# Patient Record
Sex: FEMALE | Race: WHITE | NOT HISPANIC OR LATINO | Employment: OTHER | URBAN - METROPOLITAN AREA
[De-identification: names, ages, dates, MRNs, and addresses within clinical notes are randomized per-mention and may not be internally consistent; named-entity substitution may affect disease eponyms.]

---

## 2017-03-20 ENCOUNTER — TRANSCRIBE ORDERS (OUTPATIENT)
Dept: ADMINISTRATIVE | Facility: HOSPITAL | Age: 42
End: 2017-03-20

## 2017-03-20 DIAGNOSIS — Z12.31 VISIT FOR SCREENING MAMMOGRAM: Primary | ICD-10-CM

## 2017-04-24 ENCOUNTER — HOSPITAL ENCOUNTER (OUTPATIENT)
Dept: MAMMOGRAPHY | Facility: HOSPITAL | Age: 42
Discharge: HOME | End: 2017-04-24
Attending: OBSTETRICS & GYNECOLOGY | Admitting: OBSTETRICS & GYNECOLOGY

## 2017-04-24 DIAGNOSIS — Z12.31 VISIT FOR SCREENING MAMMOGRAM: ICD-10-CM

## 2017-04-24 PROCEDURE — G0202 SCR MAMMO BI INCL CAD: HCPCS

## 2017-04-24 PROCEDURE — 77067 SCR MAMMO BI INCL CAD: CPT | Performed by: RADIOLOGY

## 2017-04-24 PROCEDURE — 77063 BREAST TOMOSYNTHESIS BI: CPT

## 2017-04-24 PROCEDURE — 77063 BREAST TOMOSYNTHESIS BI: CPT | Performed by: RADIOLOGY

## 2021-03-29 RX ORDER — LEVOTHYROXINE SODIUM 0.1 MG/1
TABLET ORAL
Qty: 30 TABLET | Refills: 1 | Status: SHIPPED | OUTPATIENT
Start: 2021-03-29 | End: 2021-04-07 | Stop reason: SDUPTHER

## 2021-04-07 RX ORDER — LEVOTHYROXINE SODIUM 0.1 MG/1
TABLET ORAL
Qty: 30 TABLET | Refills: 2 | Status: SHIPPED | OUTPATIENT
Start: 2021-04-07 | End: 2021-07-23 | Stop reason: SDUPTHER

## 2021-07-23 ENCOUNTER — OFFICE VISIT (OUTPATIENT)
Dept: ENDOCRINOLOGY | Facility: CLINIC | Age: 46
End: 2021-07-23

## 2021-07-23 ENCOUNTER — LAB (OUTPATIENT)
Dept: LAB | Facility: HOSPITAL | Age: 46
End: 2021-07-23

## 2021-07-23 VITALS
HEIGHT: 65 IN | SYSTOLIC BLOOD PRESSURE: 110 MMHG | HEART RATE: 60 BPM | BODY MASS INDEX: 35.32 KG/M2 | WEIGHT: 212 LBS | DIASTOLIC BLOOD PRESSURE: 72 MMHG

## 2021-07-23 DIAGNOSIS — E89.0 POSTABLATIVE HYPOTHYROIDISM: Primary | Chronic | ICD-10-CM

## 2021-07-23 DIAGNOSIS — E89.0 POSTABLATIVE HYPOTHYROIDISM: Chronic | ICD-10-CM

## 2021-07-23 LAB
T4 FREE SERPL-MCNC: 1.09 NG/DL (ref 0.93–1.7)
TSH SERPL DL<=0.05 MIU/L-ACNC: 1.81 UIU/ML (ref 0.27–4.2)

## 2021-07-23 PROCEDURE — 84439 ASSAY OF FREE THYROXINE: CPT

## 2021-07-23 PROCEDURE — 84443 ASSAY THYROID STIM HORMONE: CPT

## 2021-07-23 PROCEDURE — 99213 OFFICE O/P EST LOW 20 MIN: CPT | Performed by: PHYSICIAN ASSISTANT

## 2021-07-23 RX ORDER — SERTRALINE HYDROCHLORIDE 100 MG/1
100 TABLET, FILM COATED ORAL DAILY
COMMUNITY
Start: 2021-06-22

## 2021-07-23 RX ORDER — LEVOTHYROXINE SODIUM 0.1 MG/1
100 TABLET ORAL DAILY
Qty: 30 TABLET | Refills: 11 | Status: SHIPPED | OUTPATIENT
Start: 2021-07-23 | End: 2022-08-26 | Stop reason: SDUPTHER

## 2021-07-23 NOTE — PROGRESS NOTES
"     Office Note      Date: 2021  Patient Name: Tracee Borges  MRN: [id number]  : 1975    Chief Complaint   Patient presents with   • Hypothyroidism       History of Present Illness:   Tracee Borges is a 45 y.o. female who presents today for postablative hypothyroidism.  She has history of Graves' disease, s/p I-131 on 3/27/2014.  Levothyroxine was increased from 88 mcg daily to 200 mcg daily last year in 3/2020.  She reports having labs with her PCP, Dr. Sirena Payton, in November and thinks that she had her thyroid tested.  She reports fatigue has improved.  She reports hair is getting thinner.  She denies other symptoms of hypo or hyperthyroidism at this time.  She has not noticed any changes in size of her neck.  She denies any problems with her eyes.    Subjective      Review of Systems:  Review of Systems   Constitutional: Negative.         Hair loss   HENT: Negative.    Eyes: Negative.    Cardiovascular: Negative.    Gastrointestinal: Negative.    Endocrine: Negative.      Past Medical History:   Diagnosis Date   • Gestational diabetes mellitus    • Graves' disease     s/p 18 mCi I-131 on 3/27/2014   • Obesity    • Postablative hypothyroidism      Past Surgical History:   Procedure Laterality Date   •  SECTION      x2   • GASTRIC SLEEVE LAPAROSCOPIC  2017       The following portions of the patient's history were reviewed and updated as appropriate: allergies, current medications, past family history, past medical history, past social history, past surgical history and problem list.    Objective     Vitals:    21 1357   BP: 110/72   Pulse: 60   Weight: 96.2 kg (212 lb)   Height: 165.1 cm (65\")   PainSc: 0-No pain     Body mass index is 35.28 kg/m².    Physical Exam  Vitals reviewed.   Constitutional:       General: She is not in acute distress.  Neck:      Thyroid: No thyroid mass, thyromegaly or thyroid tenderness.   Lymphadenopathy:      Cervical: No cervical adenopathy. "   Neurological:      Mental Status: She is alert and oriented to person, place, and time.   Psychiatric:         Attention and Perception: Attention normal.         Mood and Affect: Mood normal.         Current Outpatient Medications   Medication Instructions   • levothyroxine (SYNTHROID, LEVOTHROID) 100 mcg, Oral, Daily   • sertraline (ZOLOFT) 100 mg, Oral, Daily   • Sprintec 28 0.25-35 MG-MCG per tablet 1 tablet, Oral, Daily       Assessment / Plan      Assessment & Plan:  1. Postablative hypothyroidism  Clinically euthyroid.  Continue levothyroxine.  Check TFTs today.  Will notify her of results and if dose adjustment is indicated.  If euthyroid, she will follow up in 1 year, sooner PRN.  - TSH; Future  - T4, Free; Future  - levothyroxine (SYNTHROID, LEVOTHROID) 100 MCG tablet; Take 1 tablet by mouth Daily.  Dispense: 30 tablet; Refill: 11      Return in about 1 year (around 7/23/2022) for Next scheduled follow up.    JEANA Ravi  Endocrinology  07/23/2021

## 2022-08-10 ENCOUNTER — PRE-ADMISSION TESTING (OUTPATIENT)
Dept: PREADMISSION TESTING | Facility: HOSPITAL | Age: 47
End: 2022-08-10

## 2022-08-10 VITALS — WEIGHT: 199.74 LBS | BODY MASS INDEX: 33.28 KG/M2 | HEIGHT: 65 IN

## 2022-08-10 LAB
ANION GAP SERPL CALCULATED.3IONS-SCNC: 10 MMOL/L (ref 5–15)
BUN SERPL-MCNC: 12 MG/DL (ref 6–20)
BUN/CREAT SERPL: 13.5 (ref 7–25)
CALCIUM SPEC-SCNC: 9 MG/DL (ref 8.6–10.5)
CHLORIDE SERPL-SCNC: 103 MMOL/L (ref 98–107)
CO2 SERPL-SCNC: 27 MMOL/L (ref 22–29)
CREAT SERPL-MCNC: 0.89 MG/DL (ref 0.57–1)
DEPRECATED RDW RBC AUTO: 47.8 FL (ref 37–54)
EGFRCR SERPLBLD CKD-EPI 2021: 81.6 ML/MIN/1.73
ERYTHROCYTE [DISTWIDTH] IN BLOOD BY AUTOMATED COUNT: 13.4 % (ref 12.3–15.4)
GLUCOSE SERPL-MCNC: 89 MG/DL (ref 65–99)
HCT VFR BLD AUTO: 38.2 % (ref 34–46.6)
HGB BLD-MCNC: 12.9 G/DL (ref 12–15.9)
MCH RBC QN AUTO: 32.3 PG (ref 26.6–33)
MCHC RBC AUTO-ENTMCNC: 33.8 G/DL (ref 31.5–35.7)
MCV RBC AUTO: 95.5 FL (ref 79–97)
PLATELET # BLD AUTO: 367 10*3/MM3 (ref 140–450)
PMV BLD AUTO: 8.8 FL (ref 6–12)
POTASSIUM SERPL-SCNC: 4.2 MMOL/L (ref 3.5–5.2)
RBC # BLD AUTO: 4 10*6/MM3 (ref 3.77–5.28)
SARS-COV-2 RNA PNL SPEC NAA+PROBE: NOT DETECTED
SODIUM SERPL-SCNC: 140 MMOL/L (ref 136–145)
WBC NRBC COR # BLD: 8.08 10*3/MM3 (ref 3.4–10.8)

## 2022-08-10 PROCEDURE — C9803 HOPD COVID-19 SPEC COLLECT: HCPCS

## 2022-08-10 PROCEDURE — U0004 COV-19 TEST NON-CDC HGH THRU: HCPCS

## 2022-08-10 PROCEDURE — 80048 BASIC METABOLIC PNL TOTAL CA: CPT

## 2022-08-10 PROCEDURE — 36415 COLL VENOUS BLD VENIPUNCTURE: CPT

## 2022-08-10 PROCEDURE — 85027 COMPLETE CBC AUTOMATED: CPT

## 2022-08-10 NOTE — PAT
An arrival time for procedure was not given during PAT visit. If patient had any questions or concerns about their arrival time, they were instructed to contact their surgeon/physician.  Additionally, if the patient referred to an arrival time that was acquired from their my chart account, patient was encouraged to verify that time with their surgeon/physician.  NO arrival times given in Pre Admission Testing Department.    Patient to apply Chlorhexadine wipes  to surgical area (as instructed) the night before procedure and the AM of procedure. Wipes provided.    Consent signed.

## 2022-08-11 ENCOUNTER — ANESTHESIA EVENT (OUTPATIENT)
Dept: PERIOP | Facility: HOSPITAL | Age: 47
End: 2022-08-11

## 2022-08-11 RX ORDER — SODIUM CHLORIDE 0.9 % (FLUSH) 0.9 %
10 SYRINGE (ML) INJECTION AS NEEDED
Status: CANCELLED | OUTPATIENT
Start: 2022-08-11

## 2022-08-11 RX ORDER — FAMOTIDINE 10 MG/ML
20 INJECTION, SOLUTION INTRAVENOUS ONCE
Status: CANCELLED | OUTPATIENT
Start: 2022-08-11 | End: 2022-08-11

## 2022-08-11 RX ORDER — SODIUM CHLORIDE 0.9 % (FLUSH) 0.9 %
10 SYRINGE (ML) INJECTION EVERY 12 HOURS SCHEDULED
Status: CANCELLED | OUTPATIENT
Start: 2022-08-11

## 2022-08-12 ENCOUNTER — ANESTHESIA (OUTPATIENT)
Dept: PERIOP | Facility: HOSPITAL | Age: 47
End: 2022-08-12

## 2022-08-12 ENCOUNTER — HOSPITAL ENCOUNTER (OUTPATIENT)
Facility: HOSPITAL | Age: 47
Discharge: HOME | End: 2022-08-13
Attending: PLASTIC SURGERY | Admitting: PLASTIC SURGERY

## 2022-08-12 DIAGNOSIS — N64.81 BREAST PTOSIS: ICD-10-CM

## 2022-08-12 LAB
B-HCG UR QL: NEGATIVE
EXPIRATION DATE: NORMAL
INTERNAL NEGATIVE CONTROL: NORMAL
INTERNAL POSITIVE CONTROL: NORMAL
Lab: NORMAL

## 2022-08-12 PROCEDURE — 25010000002 HYDROMORPHONE PER 4 MG: Performed by: NURSE ANESTHETIST, CERTIFIED REGISTERED

## 2022-08-12 PROCEDURE — 25010000002 PROPOFOL 10 MG/ML EMULSION: Performed by: NURSE ANESTHETIST, CERTIFIED REGISTERED

## 2022-08-12 PROCEDURE — 25010000002 ONDANSETRON PER 1 MG: Performed by: NURSE ANESTHETIST, CERTIFIED REGISTERED

## 2022-08-12 PROCEDURE — 0 LIDOCAINE 1 % SOLUTION 20 ML VIAL: Performed by: PLASTIC SURGERY

## 2022-08-12 PROCEDURE — 25010000002 CEFAZOLIN IN DEXTROSE 2-4 GM/100ML-% SOLUTION: Performed by: PLASTIC SURGERY

## 2022-08-12 PROCEDURE — 25010000002 FENTANYL CITRATE (PF) 50 MCG/ML SOLUTION: Performed by: NURSE ANESTHETIST, CERTIFIED REGISTERED

## 2022-08-12 PROCEDURE — 25010000002 FENTANYL CITRATE (PF) 50 MCG/ML SOLUTION

## 2022-08-12 PROCEDURE — 25010000002 EPINEPHRINE PER 0.1 MG: Performed by: PLASTIC SURGERY

## 2022-08-12 PROCEDURE — 25010000002 DEXAMETHASONE PER 1 MG: Performed by: NURSE ANESTHETIST, CERTIFIED REGISTERED

## 2022-08-12 PROCEDURE — 88305 TISSUE EXAM BY PATHOLOGIST: CPT | Performed by: PLASTIC SURGERY

## 2022-08-12 PROCEDURE — 81025 URINE PREGNANCY TEST: CPT | Performed by: PLASTIC SURGERY

## 2022-08-12 DEVICE — DEV CONTRL TISS STRATAFIX SPIRAL MNCRYL UD 3/0 PLS 60CM: Type: IMPLANTABLE DEVICE | Site: BREAST | Status: FUNCTIONAL

## 2022-08-12 RX ORDER — SODIUM CHLORIDE 9 MG/ML
50 INJECTION, SOLUTION INTRAVENOUS CONTINUOUS
Status: DISCONTINUED | OUTPATIENT
Start: 2022-08-12 | End: 2022-08-13 | Stop reason: HOSPADM

## 2022-08-12 RX ORDER — FENTANYL CITRATE 50 UG/ML
50 INJECTION, SOLUTION INTRAMUSCULAR; INTRAVENOUS
Status: DISCONTINUED | OUTPATIENT
Start: 2022-08-12 | End: 2022-08-12 | Stop reason: HOSPADM

## 2022-08-12 RX ORDER — LIDOCAINE HYDROCHLORIDE 10 MG/ML
0.5 INJECTION, SOLUTION EPIDURAL; INFILTRATION; INTRACAUDAL; PERINEURAL ONCE AS NEEDED
Status: COMPLETED | OUTPATIENT
Start: 2022-08-12 | End: 2022-08-12

## 2022-08-12 RX ORDER — CEPHALEXIN 250 MG/1
500 CAPSULE ORAL EVERY 12 HOURS SCHEDULED
Status: DISCONTINUED | OUTPATIENT
Start: 2022-08-12 | End: 2022-08-13 | Stop reason: HOSPADM

## 2022-08-12 RX ORDER — DIPHENHYDRAMINE HYDROCHLORIDE 50 MG/ML
12.5 INJECTION INTRAMUSCULAR; INTRAVENOUS EVERY 6 HOURS PRN
Status: DISCONTINUED | OUTPATIENT
Start: 2022-08-12 | End: 2022-08-13 | Stop reason: HOSPADM

## 2022-08-12 RX ORDER — MELOXICAM 7.5 MG/1
15 TABLET ORAL DAILY
Status: DISCONTINUED | OUTPATIENT
Start: 2022-08-12 | End: 2022-08-13 | Stop reason: HOSPADM

## 2022-08-12 RX ORDER — HYDROMORPHONE HYDROCHLORIDE 1 MG/ML
0.5 INJECTION, SOLUTION INTRAMUSCULAR; INTRAVENOUS; SUBCUTANEOUS
Status: DISCONTINUED | OUTPATIENT
Start: 2022-08-12 | End: 2022-08-12 | Stop reason: HOSPADM

## 2022-08-12 RX ORDER — SODIUM CHLORIDE, SODIUM LACTATE, POTASSIUM CHLORIDE, CALCIUM CHLORIDE 600; 310; 30; 20 MG/100ML; MG/100ML; MG/100ML; MG/100ML
9 INJECTION, SOLUTION INTRAVENOUS CONTINUOUS
Status: DISCONTINUED | OUTPATIENT
Start: 2022-08-12 | End: 2022-08-13 | Stop reason: HOSPADM

## 2022-08-12 RX ORDER — HYDROMORPHONE HCL 110MG/55ML
PATIENT CONTROLLED ANALGESIA SYRINGE INTRAVENOUS AS NEEDED
Status: DISCONTINUED | OUTPATIENT
Start: 2022-08-12 | End: 2022-08-12 | Stop reason: SURG

## 2022-08-12 RX ORDER — FAMOTIDINE 20 MG/1
20 TABLET, FILM COATED ORAL ONCE
Status: COMPLETED | OUTPATIENT
Start: 2022-08-12 | End: 2022-08-12

## 2022-08-12 RX ORDER — MAGNESIUM HYDROXIDE 1200 MG/15ML
LIQUID ORAL AS NEEDED
Status: DISCONTINUED | OUTPATIENT
Start: 2022-08-12 | End: 2022-08-12 | Stop reason: HOSPADM

## 2022-08-12 RX ORDER — DEXAMETHASONE SODIUM PHOSPHATE 4 MG/ML
INJECTION, SOLUTION INTRA-ARTICULAR; INTRALESIONAL; INTRAMUSCULAR; INTRAVENOUS; SOFT TISSUE AS NEEDED
Status: DISCONTINUED | OUTPATIENT
Start: 2022-08-12 | End: 2022-08-12 | Stop reason: SURG

## 2022-08-12 RX ORDER — CEFAZOLIN SODIUM 2 G/100ML
2 INJECTION, SOLUTION INTRAVENOUS ONCE
Status: COMPLETED | OUTPATIENT
Start: 2022-08-12 | End: 2022-08-12

## 2022-08-12 RX ORDER — CYCLOBENZAPRINE HCL 10 MG
10 TABLET ORAL EVERY 8 HOURS PRN
Status: DISCONTINUED | OUTPATIENT
Start: 2022-08-12 | End: 2022-08-13 | Stop reason: HOSPADM

## 2022-08-12 RX ORDER — ROCURONIUM BROMIDE 10 MG/ML
INJECTION, SOLUTION INTRAVENOUS AS NEEDED
Status: DISCONTINUED | OUTPATIENT
Start: 2022-08-12 | End: 2022-08-12 | Stop reason: SURG

## 2022-08-12 RX ORDER — EPHEDRINE SULFATE 50 MG/ML
INJECTION, SOLUTION INTRAVENOUS AS NEEDED
Status: DISCONTINUED | OUTPATIENT
Start: 2022-08-12 | End: 2022-08-12 | Stop reason: SURG

## 2022-08-12 RX ORDER — LIDOCAINE HYDROCHLORIDE 10 MG/ML
INJECTION, SOLUTION EPIDURAL; INFILTRATION; INTRACAUDAL; PERINEURAL AS NEEDED
Status: DISCONTINUED | OUTPATIENT
Start: 2022-08-12 | End: 2022-08-12 | Stop reason: SURG

## 2022-08-12 RX ORDER — MIDAZOLAM HYDROCHLORIDE 1 MG/ML
1 INJECTION INTRAMUSCULAR; INTRAVENOUS
Status: DISCONTINUED | OUTPATIENT
Start: 2022-08-12 | End: 2022-08-12 | Stop reason: HOSPADM

## 2022-08-12 RX ORDER — PROMETHAZINE HYDROCHLORIDE 12.5 MG/1
6.25 TABLET ORAL EVERY 6 HOURS PRN
Status: DISCONTINUED | OUTPATIENT
Start: 2022-08-12 | End: 2022-08-13 | Stop reason: HOSPADM

## 2022-08-12 RX ORDER — GLYCOPYRROLATE 0.2 MG/ML
INJECTION INTRAMUSCULAR; INTRAVENOUS AS NEEDED
Status: DISCONTINUED | OUTPATIENT
Start: 2022-08-12 | End: 2022-08-12 | Stop reason: SURG

## 2022-08-12 RX ORDER — ACETAMINOPHEN 500 MG
1000 TABLET ORAL EVERY 6 HOURS
Status: DISCONTINUED | OUTPATIENT
Start: 2022-08-12 | End: 2022-08-13 | Stop reason: HOSPADM

## 2022-08-12 RX ORDER — PROMETHAZINE HYDROCHLORIDE 12.5 MG/1
6.25 SUPPOSITORY RECTAL EVERY 6 HOURS PRN
Status: DISCONTINUED | OUTPATIENT
Start: 2022-08-12 | End: 2022-08-13 | Stop reason: HOSPADM

## 2022-08-12 RX ORDER — ONDANSETRON 2 MG/ML
4 INJECTION INTRAMUSCULAR; INTRAVENOUS EVERY 6 HOURS PRN
Status: DISCONTINUED | OUTPATIENT
Start: 2022-08-12 | End: 2022-08-13 | Stop reason: HOSPADM

## 2022-08-12 RX ORDER — FENTANYL CITRATE 50 UG/ML
INJECTION, SOLUTION INTRAMUSCULAR; INTRAVENOUS
Status: COMPLETED
Start: 2022-08-12 | End: 2022-08-12

## 2022-08-12 RX ORDER — OXYCODONE HYDROCHLORIDE 5 MG/1
5 TABLET ORAL EVERY 4 HOURS PRN
Status: DISCONTINUED | OUTPATIENT
Start: 2022-08-12 | End: 2022-08-13 | Stop reason: HOSPADM

## 2022-08-12 RX ORDER — FENTANYL CITRATE 50 UG/ML
INJECTION, SOLUTION INTRAMUSCULAR; INTRAVENOUS AS NEEDED
Status: DISCONTINUED | OUTPATIENT
Start: 2022-08-12 | End: 2022-08-12 | Stop reason: SURG

## 2022-08-12 RX ORDER — ONDANSETRON 2 MG/ML
INJECTION INTRAMUSCULAR; INTRAVENOUS AS NEEDED
Status: DISCONTINUED | OUTPATIENT
Start: 2022-08-12 | End: 2022-08-12 | Stop reason: SURG

## 2022-08-12 RX ORDER — PROPOFOL 10 MG/ML
VIAL (ML) INTRAVENOUS AS NEEDED
Status: DISCONTINUED | OUTPATIENT
Start: 2022-08-12 | End: 2022-08-12 | Stop reason: SURG

## 2022-08-12 RX ADMIN — OXYCODONE 5 MG: 5 TABLET ORAL at 22:07

## 2022-08-12 RX ADMIN — SODIUM CHLORIDE 50 ML/HR: 9 INJECTION, SOLUTION INTRAVENOUS at 14:30

## 2022-08-12 RX ADMIN — OXYCODONE 5 MG: 5 TABLET ORAL at 17:40

## 2022-08-12 RX ADMIN — FENTANYL CITRATE 50 MCG: 50 INJECTION, SOLUTION INTRAMUSCULAR; INTRAVENOUS at 13:25

## 2022-08-12 RX ADMIN — CEFAZOLIN SODIUM 2 G: 2 INJECTION, SOLUTION INTRAVENOUS at 10:36

## 2022-08-12 RX ADMIN — EPHEDRINE SULFATE 5 MG: 50 INJECTION INTRAVENOUS at 07:49

## 2022-08-12 RX ADMIN — CEFAZOLIN SODIUM 2 G: 2 INJECTION, SOLUTION INTRAVENOUS at 07:36

## 2022-08-12 RX ADMIN — ACETAMINOPHEN 1000 MG: 500 TABLET, FILM COATED ORAL at 20:46

## 2022-08-12 RX ADMIN — GLYCOPYRROLATE 0.2 MG: 0.2 INJECTION INTRAMUSCULAR; INTRAVENOUS at 07:49

## 2022-08-12 RX ADMIN — LIDOCAINE HYDROCHLORIDE 100 MG: 10 INJECTION, SOLUTION EPIDURAL; INFILTRATION; INTRACAUDAL; PERINEURAL at 07:30

## 2022-08-12 RX ADMIN — PROPOFOL 200 MG: 10 INJECTION, EMULSION INTRAVENOUS at 07:30

## 2022-08-12 RX ADMIN — ROCURONIUM BROMIDE 20 MG: 50 INJECTION, SOLUTION INTRAVENOUS at 08:41

## 2022-08-12 RX ADMIN — SODIUM CHLORIDE, POTASSIUM CHLORIDE, SODIUM LACTATE AND CALCIUM CHLORIDE: 600; 310; 30; 20 INJECTION, SOLUTION INTRAVENOUS at 08:48

## 2022-08-12 RX ADMIN — FAMOTIDINE 20 MG: 20 TABLET ORAL at 06:13

## 2022-08-12 RX ADMIN — HYDROMORPHONE HYDROCHLORIDE 0.5 MG: 2 INJECTION, SOLUTION INTRAMUSCULAR; INTRAVENOUS; SUBCUTANEOUS at 12:06

## 2022-08-12 RX ADMIN — LIDOCAINE HYDROCHLORIDE 0.5 ML: 10 INJECTION, SOLUTION EPIDURAL; INFILTRATION; INTRACAUDAL; PERINEURAL at 06:13

## 2022-08-12 RX ADMIN — SODIUM CHLORIDE, POTASSIUM CHLORIDE, SODIUM LACTATE AND CALCIUM CHLORIDE: 600; 310; 30; 20 INJECTION, SOLUTION INTRAVENOUS at 12:29

## 2022-08-12 RX ADMIN — ONDANSETRON 4 MG: 2 INJECTION INTRAMUSCULAR; INTRAVENOUS at 11:57

## 2022-08-12 RX ADMIN — MELOXICAM 15 MG: 7.5 TABLET ORAL at 14:29

## 2022-08-12 RX ADMIN — ROCURONIUM BROMIDE 15 MG: 50 INJECTION, SOLUTION INTRAVENOUS at 10:11

## 2022-08-12 RX ADMIN — ROCURONIUM BROMIDE 50 MG: 50 INJECTION, SOLUTION INTRAVENOUS at 07:30

## 2022-08-12 RX ADMIN — FENTANYL CITRATE 100 MCG: 50 INJECTION, SOLUTION INTRAMUSCULAR; INTRAVENOUS at 07:30

## 2022-08-12 RX ADMIN — ACETAMINOPHEN 1000 MG: 500 TABLET, FILM COATED ORAL at 14:29

## 2022-08-12 RX ADMIN — HYDROMORPHONE HYDROCHLORIDE 0.5 MG: 2 INJECTION, SOLUTION INTRAMUSCULAR; INTRAVENOUS; SUBCUTANEOUS at 08:42

## 2022-08-12 RX ADMIN — CEPHALEXIN 500 MG: 250 CAPSULE ORAL at 20:46

## 2022-08-12 RX ADMIN — PROPOFOL 25 MCG/KG/MIN: 10 INJECTION, EMULSION INTRAVENOUS at 07:35

## 2022-08-12 RX ADMIN — DEXAMETHASONE SODIUM PHOSPHATE 8 MG: 4 INJECTION, SOLUTION INTRA-ARTICULAR; INTRALESIONAL; INTRAMUSCULAR; INTRAVENOUS; SOFT TISSUE at 07:36

## 2022-08-12 RX ADMIN — SODIUM CHLORIDE, POTASSIUM CHLORIDE, SODIUM LACTATE AND CALCIUM CHLORIDE 9 ML/HR: 600; 310; 30; 20 INJECTION, SOLUTION INTRAVENOUS at 06:14

## 2022-08-12 NOTE — ANESTHESIA PREPROCEDURE EVALUATION
Anesthesia Evaluation                  Airway   Mallampati: II  TM distance: >3 FB  Neck ROM: full  No difficulty expected  Dental - normal exam     Pulmonary - normal exam   Cardiovascular - normal exam        Neuro/Psych  (+) psychiatric history Anxiety,    GI/Hepatic/Renal/Endo    (+) obesity,   thyroid problem (GRAVE'S DZ) hypothyroidism    Musculoskeletal     Abdominal  - normal exam    Bowel sounds: normal.   Substance History      OB/GYN          Other                      Anesthesia Plan    ASA 2     general     intravenous induction     Anesthetic plan, risks, benefits, and alternatives have been provided, discussed and informed consent has been obtained with: patient.    Plan discussed with CRNA.        CODE STATUS:

## 2022-08-12 NOTE — ANESTHESIA PROCEDURE NOTES
Airway  Urgency: elective    Date/Time: 8/12/2022 7:32 AM  Airway not difficult    General Information and Staff    Patient location during procedure: OR  CRNA/CAA: Nadiya Reilly CRNA    Indications and Patient Condition  Indications for airway management: airway protection    Preoxygenated: yes  MILS maintained throughout  Mask difficulty assessment: 1 - vent by mask    Final Airway Details  Final airway type: endotracheal airway      Successful airway: ETT  Cuffed: yes   Successful intubation technique: direct laryngoscopy  Facilitating devices/methods: intubating stylet  Endotracheal tube insertion site: oral  Blade: Christine  Blade size: 3  ETT size (mm): 7.0  Cormack-Lehane Classification: grade I - full view of glottis  Placement verified by: chest auscultation and capnometry   Measured from: lips  ETT/EBT  to lips (cm): 21  Number of attempts at approach: 1  Assessment: lips, teeth, and gum same as pre-op and atraumatic intubation    Additional Comments  Negative epigastric sounds, symmetrical chest rise and fall, bilateral lung sounds clear, dentition and lungs unchanged-atraumatic

## 2022-08-12 NOTE — H&P
Plastic Surgery Consult      Admission Date:  2022  LOS:  0  Patient Care Team:  Sirena Payton MD as PCP - General (Internal Medicine)  Provider, No Known as PCP - Family Medicine    Patient Name:  Tracee Borges  :  1975  MRN:  [id number]    Date:  2022    Breast Mastopexy Consult  Chief Complaint: breast ptosis    HPI:  Tracee Borges 46yoF nonsmoker hx of graves disease with bilateral macromastia and breast ptosis. Patient states upper back and neck pain but is more interested in a breast mastopexy than a reduction. Patient states no breast surgeries. Patient states no family history of breast cancer. Patient states last mammogram 1 year ago was benign. Patient has had two children no miscarriages. Patient she wears a 40 D and would like to be a little smaller Full C/D and lifted. Patient has thought about this for a long time. Patient was seen in [facility] clinic and found appropriate for mastopexy than a reduction. Patient was then scheduled for OR.     Breast Goal:  40D to a Full C/D more lifted than reduced    PMH: Graves’ disease  PSH: postablative hypothyroidism  SH: nonmoker, denies drinking or illicit drug use. Lives in [place]. 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  FH: no Family Hx of breast cancer  Medications: No blood thinner or steroids      History:   Past Medical History:   Diagnosis Date   • Anxiety    • Gestational diabetes mellitus    • Graves' disease     s/p 18 mCi I-131 on 3/27/2014   • History of COVID-19    • Obesity    • Postablative hypothyroidism      Past Surgical History:   Procedure Laterality Date   •  SECTION      x2   • COLONOSCOPY     • GASTRIC SLEEVE LAPAROSCOPIC  2017     Family History   Problem Relation Age of Onset   • Thyroid disease Mother    • Breast cancer Neg Hx    • Ovarian cancer Neg Hx      Social History     Socioeconomic History   • Marital status:    Tobacco Use   • Smoking status: Never Smoker   • Smokeless tobacco: Never Used    Vaping Use   • Vaping Use: Never used   Substance and Sexual Activity   • Alcohol use: Yes     Comment: socially   • Drug use: Never   • Sexual activity: Defer     No Known Allergies    Medication:    Current Facility-Administered Medications:   •  ceFAZolin in dextrose (ANCEF) IVPB solution 2 g, 2 g, Intravenous, Once, Naresh Valle MD  •  lactated ringers infusion, 9 mL/hr, Intravenous, Continuous, Parker Ware MD, Last Rate: 9 mL/hr at 2214, 9 mL/hr at 22  •  midazolam (VERSED) injection 1 mg, 1 mg, Intravenous, Q10 Min PRN, Parker Ware MD    Antibiotics:  Anti-Infectives (From admission, onward)    Ordered     Dose/Rate Route Frequency Start Stop    22 0545  ceFAZolin in dextrose (ANCEF) IVPB solution 2 g        Ordering Provider: Naresh Valle MD    2 g  over 30 Minutes Intravenous Once 22 0547            No Known Allergies       Review of Systems:  Constitutional-- No Fever, chills or sweats.  Appetite okay, and no malaise. No fatigue.  HEENT-- No new vision, hearing or throat complaints.  No epistaxis or oral sores.  Denies odynophagia or dysphagia. No headache, photophobia or neck stiffness.  CV-- No chest pain, palpitation or syncope  Resp-- No SOB/cough/Hemoptysis  GI- No nausea, vomiting, or diarrhea.  No hematochezia, melena, or hematemesis. Denies jaundice or chronic liver disease.  -- No dysuria, hematuria, or flank pain.  Denies hesitancy, urgency or flank pain.  Lymph- no swollen lymph nodes in neck/axilla or groin.   Heme- No active bruising or bleeding; no Hx of DVT or PE.  MS-- no swelling or pain in the bones or joints of arms/legs.  No new back pain.  He does not verbalize pain per sister at bedside.  Neuro-- No acute focal weakness or numbness in the arms or legs.  No seizures.  Skin--No rashes.       Physical Exam:   Vital Signs:  Temp (24hrs), Av.4 °F (36.3 °C), Min:97.4 °F (36.3 °C), Max:97.4 °F (36.3 °C)    Temp  Min: 97.4 °F (36.3 °C)  Max:  97.4 °F (36.3 °C)  BP  Min: 113/71  Max: 113/71  Pulse  Min: 58  Max: 58  Resp  Min: 18  Max: 18  SpO2  Min: 98 %  Max: 98 %    Ht: 65 inches  Wt: 205 lbs  BMI: 34  BSA: 2 based on the schnur scale 628 grams    General: Well developed, well nourished, alert and cooperative, and appears to be in no acute distress.  Head: normocephalic.  HEENT: Normocephalic, atraumatic.  EOMI. No conjunctival injection. No icterus. No nasal discharge.  NECK: Neck supple, non-tender without lymphadenopathy.  Heart: Rhythm is regular. There is no peripheral edema, cyanosis or pallor. Extremities are warm and well perfused. Capillary refill is less than 2 seconds.  Lungs: Normal respiratory effort. Nonlabored. No dullness.  Abdomen: Soft, nontender, nondistended. No rebound or guarding.  Musculoskeletal: ROM intact spine and extremities. No joint erythema or tenderness.      Breast Exam: bilateral grade II breast ptosis, low breasted, medium sized areolas, symmetric in shape and volume (Lt>Rt slightly), excess skin laterally with lateral flank fat. No palpable masses or lymphadenopathy.    Rt Breast  Breast BW: 14 cm  SN-N: 33 cm  N-IMF: 15 cm  Raise the Rt nipple: 5-6 cm    Lt Breast  Breast BW: 14 cm  SN-N: 35 cm  N-IMF: 15 cm  Raise the Rt nipple: 6 cm    Laboratory Data:  Results from last 7 days   Lab Units 08/10/22  0926   WBC 10*3/mm3 8.08   HEMOGLOBIN g/dL 12.9   HEMATOCRIT % 38.2   PLATELETS 10*3/mm3 367     Results from last 7 days   Lab Units 08/10/22  0926   SODIUM mmol/L 140   POTASSIUM mmol/L 4.2   CHLORIDE mmol/L 103   CO2 mmol/L 27.0   BUN mg/dL 12   CREATININE mg/dL 0.89   GLUCOSE mg/dL 89   CALCIUM mg/dL 9.0                             Estimated Creatinine Clearance: 88.7 mL/min (by C-G formula based on SCr of 0.89 mg/dL).    Microbiology:  No results found for: ACANTHNAEG, AFBCX, BPERTUSSISCX, BLOODCX  No results found for: BCIDPCR, CXREFLEX, CSFCX, CULTURETIS  No results found for: CULTURES, HSVCX, URCX  No results  found for: EYECULTURE, GCCX, HSVCULTURE, LABHSV  No results found for: LEGIONELLA, MRSACX, MUMPSCX, MYCOPLASCX  No results found for: NOCARDIACX, STOOLCX  No results found for: THROATCX, UNSTIMCULT, URINECX, CULTURE, VZVCULTUR  No results found for: VIRALCULTU, WOUNDCX          Assessment: 46yoF nonsmoker hx of graves disease with bilateral macromastia and breast ptosis.    Plan:  - again based on PE and patient’s goals patient would benefit from a mastopexy.   - discussed the risk and benefits of the mastopexy procedure including complications (scars, tissue loss, wound healing issues particularly at the T junction of the incision, hematoma, loss or change in nipple sensation, inability to breast feed, nipple loss, infection, pulmonary embolism, death).  - discussed the risk and benefits of the liposuction procedure including complications scars, tissue loss, wound healing issues, hematoma, contour irregularities, lidocaine toxicity  - discussed cannot promise breast size or symmetry.  - mammogram 5/3/22 benign  - to OR for  bilateral breast mastopexy wise pattern excision with lateral flank liposuction. patient will stay overnight at [facility]          Naresh Valle MD  22  07:07 EDT

## 2022-08-12 NOTE — OP NOTE
Patient Name:  Tracee Borges  :  1975  MRN:  [id number]    Date of Surgery:  2022       Pre Operative Diagnosis: Bilateral macromastia  Post Operative Diagnosis:   Bilateral macromastia    PROCEDURES PERFORMED:  1) bilateral breast mastopexy superior pedicle with wise pattern excision  2) bilateral lateral chest flank liposuction about 1100 cc of liposuction    SURGEON: Naresh Valle MD    Staff:  Surgeon(s):  Naresh Valle MD    Circulator: Rob Salvador RN; Danae Padilla RN  Scrub Person: Chel Madrid; Susan Taylor  Nursing Assistant: Maryjane Abraham  Assistant: Carrie Haider RNFA     Assistant: Carrie Haider RNFA  was responsible for performing the following activities: Retraction, Suction, Irrigation, Suturing, Closing and Placing Dressing and their skilled assistance was necessary for the success of this case.    Anesthesia: General      Indications for the Procedure:      Operative Findings:  Liposuction: Rt 550 cc and Lt breast 550 cc    Description of the Procedure:     Patient was seen in preop risks and benefits were discussed with the patient who elected to proceed with the procedure. H&P was updated, consent was obtained, and patient was marked appropriately. Patient was then taken back to the OR placed in a supine position. Anesthesia was induced and the patient was then intubated. The patient was the prepped and draped in a sterile fashion. A time out was performed in which the patient and the procedure were gone over. Everyone was in agreement in terms of the patient and the procedure.     The bilateral breast pattern was remarked. A 15 blade was use to make a poke hole incision. Tumescent solution was placed in bilateral chest flanks 450 cc to tumescent solution per side for a total of 900 cc. A 4.6 mm cannula was used to perform separation of fat. A 5.2 mm cannula was used to perform fat aspiration a total of 550 cc per side for a grand total of 1100 cc of  liposuction. A 5.2 mm cannula was then used to to perform fat equalization.       Starting with the right breast the smaller of the breast. Our preoperative marks were then re measured for symmetry. A 45 mm areola marker was then used to measure the areola to be used. A 15 blade was then used to score the areola marker. The Mcginnis pattern and pedicle was then scored with a 15 blade. A 10 blade was then used to de epithelize the superior pedicle. The bovie cautery was then used to outline the pedicle. Once the pedicle was defined. The inferior breast tissue was excised. The skin flaps were then elevated keeping a 2 cm thickness consistency. The breast tissue was then placed on the back table to be measured. The breast was then irrigated, and hemostasis was obtained. The flaps were then brought together with a 2-0 vicryl suture. The breast was then tailored tacked into position.     Attention then went to the larger left breast. Our preoperative marks were then re measured for symmetry. A 45 mm areola marker was then used to measure the areola to be used. A 15 blade was then used to score the areola marker. The Mcginnis pattern and pedicle was then scored with a 15 blade. A 10 blade was then used to de epithelize the superior pedicle. The bovie cautery was then used to outline the pedicle. Once the pedicle was defined. The inferior breast tissue was excised. The skin flaps were then elevated keeping a 2 cm thickness consistency. The breast tissue was then placed on the back table to be measured. The breast was then irrigated, and hemostasis was obtained. The flaps were then brought together with a 2-0 vicryl suture. The breast was then tailored tacked into position.     The patient was then setup noting symmetry. A ruler and marker was then used to jarred the new nipple positions checking symmetry. Once the areola position was defined a 38 mm areola marker was then used to jarred the position. The nipples were placed into  position. The breast at this point were then closed. The areola was closed with 3-0 monocryl deep dermal stitches followed by a running 4-0 monocryl. The vertical limb was closed with deep pillar 2-0 vicryl stitches followed by deep dermal 3-0 monocryl stitches and a running 4-0 subcuticular stitch. The horizontal limb was closed with deep 3-0 monocryl and N sorb followed by 3-0 stratifix stitch. The breast was then dressed with kerlix fluffs and a ACE wrap. Nipples were pink and perfused at the end of the case.    At this point in time the procedure had finished. Patient was then extubated and taken to PACU for full recovery.       Implants:    Implant Name Type Inv. Item Serial No.  Lot No. LRB No. Used Action   DEV CONTRL TISS STRATAFIX SPIRAL MNCRYL UD 3/0 PLS 60CM - [id number] Implant DEV CONTRL TISS STRATAFIX SPIRAL MNCRYL UD 3/0 PLS 60CM  [employer] N/A 2 Implanted       Specimen Removed:   Right breast tissue: 325 grams  Left breast tissue: 393 grams        Estimated Blood Loss: 200 cc    Drains Placed: 1 LUKAS drain in each breast    Complications: none immediate      Naresh Valle MD     Date: 8/12/2022  Time: 12:45 EDT

## 2022-08-12 NOTE — PLAN OF CARE
Goal Outcome Evaluation:   Pt arrived to unit this afternoon. Dressing C/D/I, Surgical bra in place. Darren drains stripped and drained. Complains of pain, pain management reviewed. Ambulated in room. Voiding. Monitoring.        Progress: no change

## 2022-08-12 NOTE — ANESTHESIA POSTPROCEDURE EVALUATION
Patient: Tracee Borges    Procedure Summary     Date: 08/12/22 Room / Location:  [facility] /  [facility]    Anesthesia Start: 0726 Anesthesia Stop:     Procedures:       MASTOPEXY (Bilateral Breast)      LIPOSUCTION FLANK (Bilateral Hip) Diagnosis:     Surgeons: Naresh Valle MD Provider: Leila Ackerman MD    Anesthesia Type: general ASA Status: 2          Anesthesia Type: general    Vitals  Vitals Value Taken Time   /60 08/12/22 1233   Temp     Pulse 56 08/12/22 1236   Resp     SpO2 100 % 08/12/22 1236   Vitals shown include unvalidated device data.    Temp: 97.0F  RR: 17    Post Anesthesia Care and Evaluation    Patient location during evaluation: PACU  Patient participation: waiting for patient participation  Level of consciousness: awake and alert and sleepy but conscious  Pain score: 0  Pain management: adequate    Airway patency: patent  Anesthetic complications: No anesthetic complications  PONV Status: none  Cardiovascular status: hemodynamically stable and acceptable  Respiratory status: nonlabored ventilation, acceptable, nasal cannula and oral airway  Hydration status: acceptable

## 2022-08-13 VITALS
SYSTOLIC BLOOD PRESSURE: 124 MMHG | TEMPERATURE: 98.2 F | DIASTOLIC BLOOD PRESSURE: 74 MMHG | HEART RATE: 74 BPM | RESPIRATION RATE: 17 BRPM | OXYGEN SATURATION: 95 %

## 2022-08-13 RX ADMIN — ACETAMINOPHEN 1000 MG: 500 TABLET, FILM COATED ORAL at 04:35

## 2022-08-13 RX ADMIN — CEPHALEXIN 500 MG: 250 CAPSULE ORAL at 08:55

## 2022-08-13 RX ADMIN — ACETAMINOPHEN 1000 MG: 500 TABLET, FILM COATED ORAL at 08:55

## 2022-08-13 RX ADMIN — MELOXICAM 15 MG: 7.5 TABLET ORAL at 08:55

## 2022-08-13 NOTE — PROGRESS NOTES
Plastic Surgery Progress Note      Admission Date:  2022  LOS:  0  Patient Care Team:  Sirena Payton MD as PCP - General (Internal Medicine)  Provider, No Known as PCP - Family Medicine    Patient Name:  Tracee Borges  :  1975  MRN:  [id number]    Date:  2022    Subjective:    VSS afebrile, patient pain control voiding and ambulating    History:   Past Medical History:   Diagnosis Date   • Anxiety    • Gestational diabetes mellitus    • Graves' disease     s/p 18 mCi I-131 on 3/27/2014   • History of COVID-19    • Obesity    • Postablative hypothyroidism      Past Surgical History:   Procedure Laterality Date   •  SECTION      x2   • COLONOSCOPY     • GASTRIC SLEEVE LAPAROSCOPIC  2017     Family History   Problem Relation Age of Onset   • Thyroid disease Mother    • Breast cancer Neg Hx    • Ovarian cancer Neg Hx      Social History     Socioeconomic History   • Marital status:    Tobacco Use   • Smoking status: Never Smoker   • Smokeless tobacco: Never Used   Vaping Use   • Vaping Use: Never used   Substance and Sexual Activity   • Alcohol use: Yes     Comment: socially   • Drug use: Never   • Sexual activity: Defer     No Known Allergies    Medication:    Current Facility-Administered Medications:   •  acetaminophen (TYLENOL) tablet 1,000 mg, 1,000 mg, Oral, Q6H, Naresh Valle MD, 1,000 mg at 22 0435  •  cephalexin (KEFLEX) capsule 500 mg, 500 mg, Oral, Q12H, Naresh Valle MD, 500 mg at 22 2046  •  cyclobenzaprine (FLEXERIL) tablet 10 mg, 10 mg, Oral, Q8H PRN, Naresh Valle MD  •  diphenhydrAMINE (BENADRYL) injection 12.5 mg, 12.5 mg, Intravenous, Q6H PRN, Naresh Valle MD  •  lactated ringers infusion, 9 mL/hr, Intravenous, Continuous, Parker Ware MD, Stopped at 22 1457  •  meloxicam (MOBIC) tablet 15 mg, 15 mg, Oral, Daily, Naresh Valle MD, 15 mg at 22 1429  •  ondansetron (ZOFRAN) injection 4 mg, 4 mg, Intravenous, Q6H PRN, Naresh Valle  MD  •  oxyCODONE (ROXICODONE) immediate release tablet 5 mg, 5 mg, Oral, Q4H PRN, Naresh Valle MD, 5 mg at 227  •  promethazine (PHENERGAN) tablet 6.25 mg, 6.25 mg, Oral, Q6H PRN **OR** promethazine (PHENERGAN) suppository 6.25 mg, 6.25 mg, Rectal, Q6H PRN, Naresh Valle MD  •  sodium chloride 0.9 % infusion, 50 mL/hr, Intravenous, Continuous, Naresh Valle MD, Stopped at 22 1701    Antibiotics:  Anti-Infectives (From admission, onward)    Ordered     Dose/Rate Route Frequency Start Stop    22 1710  cephalexin (KEFLEX) capsule 500 mg        Ordering Provider: Naresh Valle MD    500 mg Oral Every 12 Hours Scheduled 22 2100 22 0545  ceFAZolin in dextrose (ANCEF) IVPB solution 2 g        Ordering Provider: Naresh Valle MD    2 g  over 30 Minutes Intravenous Once 22 0547 22 1036            Objective:    Physical Exam:   Vital Signs:  Temp (24hrs), Av.6 °F (36.4 °C), Min:97 °F (36.1 °C), Max:98.5 °F (36.9 °C)    Temp  Min: 97 °F (36.1 °C)  Max: 98.5 °F (36.9 °C)  BP  Min: 103/69  Max: 137/78  Pulse  Min: 55  Max: 92  Resp  Min: 14  Max: 20  SpO2  Min: 93 %  Max: 100 %    GENERAL: Awake and alert, in no acute distress.   HEENT: Normocephalic, atraumatic.  EOMI. No conjunctival injection. No icterus.   NECK: Supple without nuchal rigidity. No mass.  LYMPH: No cervical, axillary or inguinal lymphadenopathy.  HEART: RRR;   LUNGS: Clear to auscultation bilaterally, Normal respiratory effort. Nonlabored. No dullness.  ABDOMEN: Soft, nontender, nondistended. No rebound or guarding. NO mass or HSM.    Breast: bilateral breast incision C/D/I no pus or drainage breast soft symmetric, nipples pink and perfused, sensation intact to nipple to light touch, no hematoma,   Chest: lateral chest flank ecchymosis minimal swelling.       Laboratory Data:  Results from last 7 days   Lab Units 08/10/22  0926   WBC 10*3/mm3 8.08   HEMOGLOBIN g/dL 12.9   HEMATOCRIT %  38.2   PLATELETS 10*3/mm3 367     Results from last 7 days   Lab Units 08/10/22  0926   SODIUM mmol/L 140   POTASSIUM mmol/L 4.2   CHLORIDE mmol/L 103   CO2 mmol/L 27.0   BUN mg/dL 12   CREATININE mg/dL 0.89   GLUCOSE mg/dL 89   CALCIUM mg/dL 9.0                             Estimated Creatinine Clearance: 88.7 mL/min (by C-G formula based on SCr of 0.89 mg/dL).    Microbiology:  No results found for: ACANTHNAEG, AFBCX, BPERTUSSISCX, BLOODCX  No results found for: BCIDPCR, CXREFLEX, CSFCX, CULTURETIS  No results found for: CULTURES, HSVCX, URCX  No results found for: EYECULTURE, GCCX, HSVCULTURE, LABHSV  No results found for: LEGIONELLA, MRSACX, MUMPSCX, MYCOPLASCX  No results found for: NOCARDIACX, STOOLCX  No results found for: THROATCX, UNSTIMCULT, URINECX, CULTURE, VZVCULTUR  No results found for: VIRALCULTU, WOUNDCX      Assessment: 46yoF nonsmoker hx of graves disease with bilateral macromastia and breast ptosis s/p 22 bilateral breast mastopexy superior pedicle wise pattern excision with lateral flank liposuction.       Plan:  - no acute surgery   - dc dressing switch to padded bra  - may shower no baths  - ambulate  - scripts at home  - no heavy lifting to upper extremity  - follow up with Dr. Naresh Valle as scheduled next week          Naresh Valle MD  22  08:44 EDT

## 2022-08-13 NOTE — PLAN OF CARE
VSS.  Incisions CDI.  LUKAS with adequate and minimal output.  Voiding.   Demonstrates use of I/S.  Ambulating.  Tolerating diet.  Pain well controlled with oral medications.

## 2022-08-13 NOTE — DISCHARGE SUMMARY
HPI  HPI:  Tracee Borges 46yoF nonsmoker hx of graves disease with bilateral macromastia and breast ptosis. Patient states upper back and neck pain but is more interested in a breast mastopexy than a reduction. Patient states no breast surgeries. Patient states no family history of breast cancer. Patient states last mammogram 1 year ago was benign. Patient has had two children no miscarriages. Patient she wears a 40 D and would like to be a little smaller Full C/D and lifted. Patient has thought about this for a long time. Patient was seen in [facility] clinic and found appropriate for mastopexy than a reduction. Patient was then scheduled for OR.       Date of Surgery:  2022  Pre Operative Diagnosis: Bilateral macromastia  Post Operative Diagnosis:   Bilateral macromastia  PROCEDURES PERFORMED:  1) bilateral breast mastopexy superior pedicle with wise pattern excision  2) bilateral lateral chest flank liposuction     Hospital Course  Patient was admitted for planned observation. Patient did well overnight. Patient was seen in AM rounds. Patient was voiding, ambulating, tolerating PO, no nausea, and pain controlled. Patient was appropriate for discharge and was discharged home.

## 2022-08-16 LAB
CYTO UR: NORMAL
LAB AP CASE REPORT: NORMAL
LAB AP CLINICAL INFORMATION: NORMAL
PATH REPORT.FINAL DX SPEC: NORMAL
PATH REPORT.GROSS SPEC: NORMAL

## 2022-08-17 DIAGNOSIS — E89.0 POSTABLATIVE HYPOTHYROIDISM: Chronic | ICD-10-CM

## 2022-08-17 RX ORDER — LEVOTHYROXINE SODIUM 0.1 MG/1
TABLET ORAL
Qty: 30 TABLET | Refills: 11 | OUTPATIENT
Start: 2022-08-17

## 2022-08-26 ENCOUNTER — OFFICE VISIT (OUTPATIENT)
Dept: ENDOCRINOLOGY | Facility: CLINIC | Age: 47
End: 2022-08-26

## 2022-08-26 ENCOUNTER — LAB (OUTPATIENT)
Dept: LAB | Facility: HOSPITAL | Age: 47
End: 2022-08-26

## 2022-08-26 VITALS
SYSTOLIC BLOOD PRESSURE: 118 MMHG | DIASTOLIC BLOOD PRESSURE: 75 MMHG | HEIGHT: 65 IN | BODY MASS INDEX: 32.99 KG/M2 | WEIGHT: 198 LBS | HEART RATE: 78 BPM | OXYGEN SATURATION: 98 %

## 2022-08-26 DIAGNOSIS — E89.0 POSTABLATIVE HYPOTHYROIDISM: Chronic | ICD-10-CM

## 2022-08-26 LAB — TSH SERPL DL<=0.05 MIU/L-ACNC: 3.18 UIU/ML (ref 0.27–4.2)

## 2022-08-26 PROCEDURE — 84443 ASSAY THYROID STIM HORMONE: CPT

## 2022-08-26 PROCEDURE — 99213 OFFICE O/P EST LOW 20 MIN: CPT | Performed by: PHYSICIAN ASSISTANT

## 2022-08-26 RX ORDER — LEVOTHYROXINE SODIUM 0.1 MG/1
100 TABLET ORAL DAILY
Qty: 90 TABLET | Refills: 3 | Status: SHIPPED | OUTPATIENT
Start: 2022-08-26

## 2022-09-11 NOTE — ASSESSMENT & PLAN NOTE
She is clinically euthyroid.  Check TSH today.  Continue levothyroxine 100 mcg daily.  Will notify her of results and if dose adjustment is indicated.

## 2023-05-15 NOTE — DISCHARGE INSTRUCTIONS
The following information and instructions were given:    Do not eat, drink, smoke or chew gum after midnight the night before surgery. This includes no mints.  Take all routine, prescribed medications including heart and blood pressure medicines with a sip of water unless otherwise instructed by your physician.   Do NOT take diabetic medication unless instructed by your physician.    DO NOT shave for two days before your procedure.  Do not wear makeup.      DO NOT wear fingernail polish (gel/regular) and/or acrylic/artificial nails on the day of surgery.   If you had a recent manicure and would rather not remove polish or artificial nails, the minimum requirement is that the polish/artificial nails must be removed from the middle finger on each hand.      If you are having surgery/procedure on an upper extremity, fingernail polish/artificial fingernails must be removed for surgery.  NO EXCEPTIONS.      If you are having surgery/procedure on a lower extremity, toenail polish on both extremities must be removed for surgery.  NO EXCEPTIONS.    Remove all jewelry (advise to go to jeweler if unable to remove).  Jewelry, especially rings, can no longer be taped for surgery.    Leave anything you consider valuable at home.    Leave your suitcase in the car until after your surgery.    Bring the following with you the day of your procedure (when applicable):       -Picture ID and insurance cards       -Co-pay/deductible required by insurance       -Medications in the original bottles (not a list) including all over-the-counter medications if not brought to PAT       -Copy of advance directive, living will or power of  documents if not brought to PAT       -CPAP or BIPAP mask and tubing (do not bring machine)       -Skin prep instruction(s) sheet       -PAT Pass    Education booklet (when applicable), brochure, handout or binder related to procedure given to patient.  Education booklet also includes general  information related to their recovery that mentions signs and symptoms of infection and when to call the doctor.    When applicable, an ERAS handout was given to patient.    Respirex use and pneumonia prevention education provided in Pre Admission Testing general education video.    Signs and Symptoms of infection discussed with patient in Pre Admission Testing. Information related to infection and hand hygiene mentioned in Pre Admission Testing general education video. Patient instructed to call their doctor if any of the following symptoms are noted during recovery:  Fever of 100.4 F or higher, incision that is warm or has increasing bleeding, redness or drainage.    DVT Prevention instructions given in general education video presentation during Pre Admission Testing appointment that stress the importance of ambulation to improve blood circulation.  Also encouraged patient to perform foot exercises when in bed and application of a sequential device may be applied to lower extremities to improve circulation.      Please apply Chlorhexidine wipes to surgical area (if instructed) the night before procedure and the AM of procedure and document date/time of applications on skin prep instruction sheet.         Labs/Medications

## 2023-08-21 DIAGNOSIS — E89.0 POSTABLATIVE HYPOTHYROIDISM: Chronic | ICD-10-CM

## 2023-08-21 RX ORDER — LEVOTHYROXINE SODIUM 0.1 MG/1
100 TABLET ORAL DAILY
Qty: 90 TABLET | Refills: 0 | Status: SHIPPED | OUTPATIENT
Start: 2023-08-21

## 2023-08-21 NOTE — TELEPHONE ENCOUNTER
Rx Refill Note  Requested Prescriptions      No prescriptions requested or ordered in this encounter        Last office visit with prescribing clinician: 8/26/22     Next office visit with prescribing clinician: 8/29/2023       Racquel Joel (Jodi)  08/21/23, 14:18 EDT

## (undated) DEVICE — TBG PENCL TELESCP MEGADYNE SMOKE EVAC 10FT

## (undated) DEVICE — DRSNG SURESITE WNDW 2.38X2.75

## (undated) DEVICE — BANDAGE,GAUZE,BULKEE II,4.5"X4.1YD,STRL: Brand: MEDLINE

## (undated) DEVICE — ANTIBACTERIAL UNDYED BRAIDED (POLYGLACTIN 910), SYNTHETIC ABSORBABLE SUTURE: Brand: COATED VICRYL

## (undated) DEVICE — SUT MNCRYL PLS ANTIB UD 3/0 PS2 27IN

## (undated) DEVICE — 1010 S-DRAPE TOWEL DRAPE 10/BX: Brand: STERI-DRAPE™

## (undated) DEVICE — ADHS SKIN PREMIERPRO EXOFIN TOPICAL HI/VISC .5ML

## (undated) DEVICE — PK CHST BRST 10

## (undated) DEVICE — TBG SXN LIPECTOMY 108IN

## (undated) DEVICE — SUT MONOCRYL PLS ANTIB UND 3/0  PS1 27IN

## (undated) DEVICE — CVR HNDL LIGHT RIGID

## (undated) DEVICE — UNDERGLV SURG BIOGEL INDICAT PI SZ8 BLU

## (undated) DEVICE — BLANKT WARM PED/CHLD 130X92CM DISP LF

## (undated) DEVICE — ELECTRD BLD EZ CLN MOD XLNG 2.75IN

## (undated) DEVICE — PATIENT RETURN ELECTRODE, SINGLE-USE, CONTACT QUALITY MONITORING, ADULT, WITH 9FT CORD, FOR PATIENTS WEIGING OVER 33LBS. (15KG): Brand: MEGADYNE

## (undated) DEVICE — TRAP FLD MINIVAC MEGADYNE 100ML

## (undated) DEVICE — LINER CANSTR SXN HERCULES

## (undated) DEVICE — SUT MNCRYL PLS ANTIB UD 4/0 PS2 18IN

## (undated) DEVICE — BLANKT WARM LOWR/BDY 100X120CM

## (undated) DEVICE — SUT ETHLN 5/0 P3 18IN 698G

## (undated) DEVICE — GLV SURG SENSICARE W/ALOE PF LF 7.5 STRL

## (undated) DEVICE — SPNG GZ WOVN 4X4IN 12PLY 10/BX STRL

## (undated) DEVICE — APPL CHLORAPREP TINTED 26ML TEAL

## (undated) DEVICE — Device